# Patient Record
Sex: FEMALE | Race: BLACK OR AFRICAN AMERICAN
[De-identification: names, ages, dates, MRNs, and addresses within clinical notes are randomized per-mention and may not be internally consistent; named-entity substitution may affect disease eponyms.]

---

## 2018-03-20 ENCOUNTER — HOSPITAL ENCOUNTER (INPATIENT)
Dept: HOSPITAL 74 - JDEL | Age: 28
LOS: 3 days | Discharge: HOME | DRG: 560 | End: 2018-03-23
Attending: OBSTETRICS & GYNECOLOGY | Admitting: OBSTETRICS & GYNECOLOGY
Payer: COMMERCIAL

## 2018-03-20 VITALS — BODY MASS INDEX: 37.8 KG/M2

## 2018-03-20 DIAGNOSIS — Z3A.38: ICD-10-CM

## 2018-03-20 LAB
BASOPHILS # BLD: 0.7 % (ref 0–2)
DEPRECATED RDW RBC AUTO: 14.6 % (ref 11.6–15.6)
EOSINOPHIL # BLD: 0.5 % (ref 0–4.5)
HCT VFR BLD CALC: 37.4 % (ref 32.4–45.2)
HGB BLD-MCNC: 12.7 GM/DL (ref 10.7–15.3)
LYMPHOCYTES # BLD: 21.2 % (ref 8–40)
MCH RBC QN AUTO: 28.9 PG (ref 25.7–33.7)
MCHC RBC AUTO-ENTMCNC: 33.9 G/DL (ref 32–36)
MCV RBC: 85.3 FL (ref 80–96)
MONOCYTES # BLD AUTO: 8.7 % (ref 3.8–10.2)
NEUTROPHILS # BLD: 68.9 % (ref 42.8–82.8)
PLATELET # BLD AUTO: 152 K/MM3 (ref 134–434)
PMV BLD: 10.3 FL (ref 7.5–11.1)
RBC # BLD AUTO: 4.38 M/MM3 (ref 3.6–5.2)
WBC # BLD AUTO: 13.6 K/MM3 (ref 4–10)

## 2018-03-20 NOTE — HP
Past Medical History





- Primary Care Physician


PCP:: Edda Mckeon





- Admission


Chief Complaint: 27 yrs  , 38.4 weeks in labor..Onset Lp since 9.00 pm.  

pt was evaluated for labor on 3/19/18 & sent home


History of Present Illness: 


PNC at [facility] .


wt gain 62 lbs . 


Prenatal work Up : B pos, Rpr nr, Hbsag neg, Rubella pos, , Quantiferon neg, , 

Hiv neg, gc/ct neg , Pap normal 








History Source: Patient


Limitations to Obtaining History: No Limitations





- Past Medical History


CNS: No: Migraine, Seizure


Cardiovascular: No: HTN, Murmur


Pulmonary: No: Asthma


Gastrointestinal: Yes: Constipation


Hepatobiliary: No: Hepatitis B


Renal/: No: UTI


...: 1


...Para: 0


...Term: 0


...: 0


...Spon : 0


...Induced : 0


...Multiple Gestation: 0


...LMP: 17


... Weeks Gestation by Dates: 38.1


...EDC by Dates: 18


...EDC by Sono: 18 (38.4)


Heme/Onc: Yes: Anemia


Infectious Disease: No: HIV, Tuberculosis


Psych: No: Addictions, Anxiety, Bipolar, Depression





- Past Surgical History


Past Surgical History: Yes: None


Hx Myomectomy: No


Hx Transabdominal Cerclage: No





- Smoking History


Smoking history: Former smoker


Have you smoked in the past 12 months: Yes


Aproximately how many cigarettes per day: 4 (stopeed 2017, when knew about 

pregnancy)


If you are a former smoker, when did you quit?: WHEN SHE FOUND OUT SHE WAS 

PREGNANT





- Alcohol/Substance Use


Hx Alcohol Use: No


History of Substance Use: reports: None





Home Medications





- Allergies


Allergies/Adverse Reactions: 


 Allergies











Allergy/AdvReac Type Severity Reaction Status Date / Time


 


No Known Allergies Allergy   Verified 18 12:02














- Home Medications


Home Medications: 


Ambulatory Orders





Prenatal Tablet 1 tablet PO DAILY 18 


Ferrous Sulfate 325 mg PO DAILY 18 











Physical Exam - Maternity


Vital Signs: 


 Vital Signs











Temperature  97.8 F   18 22:55


 


Pulse Rate  82   18 22:55


 


Respiratory Rate  20   18 22:55


 


Blood Pressure  131/55   18 22:55


 


O2 Sat by Pulse Oximetry (%)      











Constitutional: Yes: Well Nourished


Eyes: Yes: WNL


HENT: Yes: WNL, Normocephalic


Neck: Yes: WNL


Cardiovascular: Yes: WNL


Lungs: Clear to auscultation


Breast(s): Yes: WNL





- Abdominal Exam/OB


Fundal Height: 38


Number of Fetuses: Single


Fetal Presentation: Vertex


Contractions: Yes


Regularity: Irregular (3-5 min)


Intensity: Mod/Strong


Fetal Monitor Mode: External


Fetal Heart Rate (range): 145


Fetal Heart Rate Location: [facility]


Category: I


Accelerations: Uniform





- Vaginal Exam/OB


Vaginal Bleediing: No


Speculum Exam: No


Dilatation (cm): 4


Effacement (%): 90


Amniotic Membrane Status: Intact


Fetal Presentation: Vertex/Position


Fetal Station: -2





- Physical Exam


Musculoskeletal: Yes: WNL


Extremities: Yes: Deformity.  No: Calf Tenderness


Edema: Yes


Edema: LLE: 1+, RLE: 1+


Integumentary: Yes: WNL, Tattoos


Deep Tendon Reflex Grade: Normal +2


...Motor Strength: WNL


Psychiatric: Yes: WNL, Alert, Oriented





- Labs


Lab Results: 





 Laboratory Tests











  18





  23:20 23:20 23:20


 


WBC  13.6 H  


 


Hgb  12.7  


 


Hct  37.4  


 


Plt Count  152  


 


Neutrophils %  68.9  


 


Lymphocytes %  21.2  


 


Monocytes %  8.7  


 


PT with INR   10.70 


 


INR   0.95 


 


PTT (Actin FS)   27.8 


 


Sodium    136


 


Potassium    4.0


 


Chloride    103


 


Carbon Dioxide    25


 


BUN    7


 


Creatinine    0.6


 


Random Glucose    83


 


Calcium    8.6


 


HIV 1&2 Antibody Screen   


 


HIV P24 Antigen   


 


Blood Type   


 


Antibody Screen   














  18





  23:20 23:20


 


WBC  


 


Hgb  


 


Hct  


 


Plt Count  


 


Neutrophils %  


 


Lymphocytes %  


 


Monocytes %  


 


PT with INR  


 


INR  


 


PTT (Actin FS)  


 


Sodium  


 


Potassium  


 


Chloride  


 


Carbon Dioxide  


 


BUN  


 


Creatinine  


 


Random Glucose  


 


Calcium  


 


HIV 1&2 Antibody Screen   Negative


 


HIV P24 Antigen   Negative


 


Blood Type  B POSITIVE 


 


Antibody Screen  Negative 














Problem List





- Problems


(1) Pregnancy with 38 completed weeks gestation


Code(s): Z3A.38 - 38 WEEKS GESTATION OF PREGNANCY   





(2) Labor established


Code(s): IYH5040 -    





Assessment/Plan


27 yrs  , 38.4 weeks by sono & 38.1/7 weeks admitted in labor . GBS neg 


Plan : trial of labor for vag . delivery 


         epidural labor analgesia prn

## 2018-03-21 LAB
ANION GAP SERPL CALC-SCNC: 8 MMOL/L (ref 8–16)
APTT BLD: 27.8 SECONDS (ref 26.9–34.4)
ARTERIAL BLOOD GAS PCO2: 58.6 MMHG (ref 35–45)
BASE EXCESS BLDA CALC-SCNC: -5 MEQ/L (ref -2–2)
BUN SERPL-MCNC: 7 MG/DL (ref 7–18)
CALCIUM SERPL-MCNC: 8.6 MG/DL (ref 8.5–10.1)
CHLORIDE SERPL-SCNC: 103 MMOL/L (ref 98–107)
CO2 SERPL-SCNC: 25 MMOL/L (ref 21–32)
CREAT SERPL-MCNC: 0.6 MG/DL (ref 0.55–1.02)
GLUCOSE SERPL-MCNC: 83 MG/DL (ref 74–106)
INR BLD: 0.95 (ref 0.82–1.09)
PH BLDV: 7.34 [PH] (ref 7.32–7.42)
PO2 BLDA: 19.8 MMHG (ref 80–100)
POTASSIUM SERPLBLD-SCNC: 4 MMOL/L (ref 3.5–5.1)
PT PNL PPP: 10.7 SEC (ref 9.98–11.88)
SAO2 % BLDA: 38.6 % (ref 90–98.9)
SODIUM SERPL-SCNC: 136 MMOL/L (ref 136–145)
VENOUS PC02: 42 MMHG (ref 38–52)
VENOUS PO2: 34.7 MMHG (ref 28–48)

## 2018-03-21 RX ADMIN — FERROUS SULFATE TAB EC 324 MG (65 MG FE EQUIVALENT) SCH MG: 324 (65 FE) TABLET DELAYED RESPONSE at 17:41

## 2018-03-21 RX ADMIN — ACETAMINOPHEN PRN MG: 325 TABLET ORAL at 11:05

## 2018-03-21 RX ADMIN — IBUPROFEN PRN MG: 600 TABLET, FILM COATED ORAL at 11:05

## 2018-03-21 RX ADMIN — IBUPROFEN PRN MG: 600 TABLET, FILM COATED ORAL at 16:38

## 2018-03-21 RX ADMIN — ACETAMINOPHEN PRN MG: 325 TABLET ORAL at 16:38

## 2018-03-21 RX ADMIN — Medication SCH: at 11:15

## 2018-03-21 RX ADMIN — IBUPROFEN PRN MG: 600 TABLET, FILM COATED ORAL at 21:10

## 2018-03-21 RX ADMIN — ACETAMINOPHEN PRN MG: 325 TABLET ORAL at 21:12

## 2018-03-21 NOTE — PN
Progress Note, Labor


  ** Vaginal Exam #1


Labor Exam Date: 03/21/18


Labor Exam Time: 05:05


Fetal Heart Rate (range): 140


Dilatation: 8


Effacement (%): 100


Amniotic Membrane Status: Ruptured (AROM , clear , large amount)


Fetal Presentation: Vertex/Position


Fetal Station: 0


Remarks: 


fhr cat-1 


uc 2-4 min 


Epidural analgesia was given at 140 AM 


 Selected Entries











  03/21/18 03/21/18





  04:00 04:15


 


Temperature 98.4 F 


 


Pulse Rate  78


 


Blood Pressure  123/81














  ** Vaginal Exam #2


Labor Exam Date: 03/21/18


Labor Exam Time: 07:50


Fetal Heart Rate (range): 140


Dilatation: antlip


Effacement (%): 100


Amniotic Membrane Status: Ruptured


Fetal Presentation: Vertex/Position


Fetal Station: +2 (caput)


Remarks: 


uc irregular , dysfunctional 2-5 min 


fhr cat-1 


Plan pitocin Augmentation 


 Selected Entries











  03/21/18





  07:15


 


Pulse Rate 81


 


Respiratory 20





Rate 


 


Blood Pressure 128/66














  ** Vaginal Exam #3


Labor Exam Date: 03/21/18


Labor Exam Time: 08:50


Fetal Heart Rate (range): 90


Dilatation: 10


Effacement (%): 100


Amniotic Membrane Status: Ruptured


Fetal Station: +3


Remarks: 


UC 2-3 min 


FHR cat-2 


pt pushing 


 Selected Entries











  03/21/18





  08:00


 


Temperature 98.1 F


 


Pulse Rate 89


 


Respiratory 17





Rate

## 2018-03-21 NOTE — PN
Delivery





- Delivery


Vaginal Delivery: No Problems, Spontaneous (Baby dekievered in VENANCIO position, 

cord around neck x1 untangled before delivery of shoulder , immediate oral & 

nasal suction was done .)


Type of Anesthesia: Local, Epidural


Episiotomy/Laceration: Midline (episiotomy was sutured in layers with chr 

catgut #2/0 . Pr exam mucosa & sphincter was intact)


EBL (cc): 350





Delivery, Single Birth





- Stages of Labor


Date 1st Stage Initiatied: 18


Time 1st Stage Initiated: 21:00


Date 2nd Stage Initiated: 18


Time 2nd Stage Initiated: 08:50


Date of Delivery: 18


Time of Delivery: 08:57


Date Placenta Delivered: 18


Time Placenta Delivered: 09:06


Placenta: Yes: Spontaneous, Uterine Exploration





- Condition of Infant


Infant Gender: Male


Position: Right, OA (cord around neck x1)


Total Hours ROM (Hrs/Mins): 4rr 1 min 





- Apgar


  ** 1 Minute


Apgar Total Score: 9





  ** 5 Minutes


Apgar Total Score: 9





-  Feeding Plan


Initial Plan: Elected not to breastfeed exclusively throughout hospitalization





Remarks





- Remarks


Remarks: 


27 Yrs  38.4/7 weeks , GBS neg , admitted in labor 


[facility] 


Intrapartum  course was uneventful

## 2018-03-22 VITALS — TEMPERATURE: 98.3 F

## 2018-03-22 LAB
BASOPHILS # BLD: 0.2 % (ref 0–2)
DEPRECATED RDW RBC AUTO: 14.7 % (ref 11.6–15.6)
EOSINOPHIL # BLD: 0.5 % (ref 0–4.5)
HCT VFR BLD CALC: 33.4 % (ref 32.4–45.2)
HGB BLD-MCNC: 11 GM/DL (ref 10.7–15.3)
LYMPHOCYTES # BLD: 15.1 % (ref 8–40)
MCH RBC QN AUTO: 28.5 PG (ref 25.7–33.7)
MCHC RBC AUTO-ENTMCNC: 33 G/DL (ref 32–36)
MCV RBC: 86.4 FL (ref 80–96)
MONOCYTES # BLD AUTO: 7.4 % (ref 3.8–10.2)
NEUTROPHILS # BLD: 76.8 % (ref 42.8–82.8)
PLATELET # BLD AUTO: 139 K/MM3 (ref 134–434)
PMV BLD: 9.5 FL (ref 7.5–11.1)
RBC # BLD AUTO: 3.87 M/MM3 (ref 3.6–5.2)
WBC # BLD AUTO: 18.9 K/MM3 (ref 4–10)

## 2018-03-22 RX ADMIN — ACETAMINOPHEN PRN MG: 325 TABLET ORAL at 02:55

## 2018-03-22 RX ADMIN — FERROUS SULFATE TAB EC 324 MG (65 MG FE EQUIVALENT) SCH MG: 324 (65 FE) TABLET DELAYED RESPONSE at 18:15

## 2018-03-22 RX ADMIN — Medication SCH TAB: at 11:37

## 2018-03-22 RX ADMIN — IBUPROFEN PRN MG: 600 TABLET, FILM COATED ORAL at 08:14

## 2018-03-22 RX ADMIN — ACETAMINOPHEN PRN MG: 325 TABLET ORAL at 21:27

## 2018-03-22 RX ADMIN — IBUPROFEN PRN MG: 600 TABLET, FILM COATED ORAL at 02:56

## 2018-03-22 RX ADMIN — FERROUS SULFATE TAB EC 324 MG (65 MG FE EQUIVALENT) SCH MG: 324 (65 FE) TABLET DELAYED RESPONSE at 08:14

## 2018-03-22 NOTE — PN
Post Partum Progress Note





- Subjective


Subjective: 





28 yo Para 1 status post normal vaginal delivery, seen and evaluated.


Doing well.


Post Partum Day: 1


Type of Delivery: 


Vital Signs: 


 Vital Signs











Temperature  98.4 F   18 06:00


 


Pulse Rate  66   18 06:00


 


Respiratory Rate  18   18 06:00


 


Blood Pressure  101/67   18 06:00


 


O2 Sat by Pulse Oximetry (%)  96   18 22:00











Breast Exam: Yes: Soft


Uterus: Yes: Fundus Firm


Abdomen/GI: Yes: Abdomen soft, Tolerating PO


Lochia: Yes: Rubra


Lochia, amount: Moderate


Extremities: Yes: Calves non-tender


Perineum: Yes: Episiotomy (Healing)


Activity: Ambulating





- Labs


Labs: 


 CBC











WBC  18.9 K/mm3 (4.0-10.0)  H D 18  07:45    


 


RBC  3.87 M/mm3 (3.60-5.2)   18  07:45    


 


Hgb  11.0 GM/dL (10.7-15.3)  D 18  07:45    


 


Hct  33.4 % (32.4-45.2)   18  07:45    


 


MCV  86.4 fl (80-96)   18  07:45    


 


MCH  28.5 pg (25.7-33.7)   18  07:45    


 


MCHC  33.0 g/dl (32.0-36.0)   18  07:45    


 


RDW  14.7 % (11.6-15.6)   18  07:45    


 


Plt Count  139 K/MM3 (134-434)   18  07:45    


 


MPV  9.5 fl (7.5-11.1)   18  07:45    


 


Neutrophils %  76.8 % (42.8-82.8)   18  07:45    


 


Lymphocytes %  15.1 % (8-40)  D 18  07:45    


 


Monocytes %  7.4 % (3.8-10.2)   18  07:45    


 


Eosinophils %  0.5 % (0-4.5)   18  07:45    


 


Basophils %  0.2 % (0-2.0)   18  07:45    














Problem List





- Problems


(1) Status post vaginal delivery


Code(s): XRN7320 -    





Assessment/Plan





Status post vaginal delivery


Stable


Continue routine postpartum care

## 2018-03-23 VITALS — DIASTOLIC BLOOD PRESSURE: 62 MMHG | SYSTOLIC BLOOD PRESSURE: 125 MMHG | HEART RATE: 67 BPM

## 2018-03-23 RX ADMIN — Medication SCH TAB: at 08:59

## 2018-03-23 RX ADMIN — IBUPROFEN PRN MG: 600 TABLET, FILM COATED ORAL at 08:58

## 2018-03-23 RX ADMIN — ACETAMINOPHEN PRN MG: 325 TABLET ORAL at 08:58

## 2018-03-23 RX ADMIN — FERROUS SULFATE TAB EC 324 MG (65 MG FE EQUIVALENT) SCH MG: 324 (65 FE) TABLET DELAYED RESPONSE at 08:59

## 2018-03-23 NOTE — DS
Physical Exam-GYN


Vital Signs: 


 Vital Signs











Temperature  98.3 F   18 21:00


 


Pulse Rate  69   18 21:00


 


Respiratory Rate  20   18 21:00


 


Blood Pressure  106/66   18 21:00


 


O2 Sat by Pulse Oximetry (%)  96   18 22:00











Constitutional: Yes: Well Nourished


Eyes: Yes: WNL


HENT: Yes: WNL


Neck: Yes: WNL


Cardiovascular: Yes: WNL


Respiratory: Yes: WNL


Gastrointestinal: Yes: WNL


...Rectal Exam: Yes: WNL


Renal/: Yes: WNL


....Post Partum: Yes: Uterus firm, Moderate lochia rubra (episiotomy healing . c

/o perineal soreness)


Breast(s): Yes: WNL


Musculoskeletal: Yes: WNL


Extremities: Yes: WNL.  No: Calf Tenderness


Edema: Yes


Neurological: Yes: WNL


...Motor Strength: WNL


Psychiatric: Yes: WNL, Alert, Oriented


Labs: 


 CBC, BMP





 18 07:45 





 18 23:20 











Delivery





- Delivery


Vaginal Delivery: No Problems, Spontaneous (Baby dekievered in VENANCIO position, 

cord around neck x1 untangled before delivery of shoulder , immediate oral & 

nasal suction was done .)


Type of Anesthesia: Local, Epidural


Episiotomy/Laceration: Midline


EBL (cc): 350





Delivery, Single Birth





- Stages of Labor


Date 1st Stage Initiatied: 18


Time 1st Stage Initiated: 21:00


Date 2nd Stage Initiated: 18


Time 2nd Stage Initiated: 08:50


Date of Delivery: 18


Time of Delivery: 08:57


Time Placenta Delivered: 09:06


Placenta: Yes: Spontaneous, Uterine Exploration





- Condition of Infant


Pediatrician/Neonatologist Present: No


Infant Gender: Male


Birth Weight: 7 lb 3 oz


Position: Right, OA


Total Hours ROM (Hrs/Mins): 4rr 1 min 





- Apgar


  ** 1 Minute


Apgar Total Score: 9





  ** 5 Minutes


Apgar Total Score: 9





-  Feeding Plan


Initial Plan: Elected not to breastfeed exclusively throughout hospitalization





Remarks





- Remarks


Remarks: 


27 Yrs  38.4/7 weeks , GBS neg , admitted in labor 


[facility] 


Intrapartum  course was uneventful 


pp course uneventful 


discharge today 











Discharge Summary


Reason For Visit: LABOR ADMIT


Current Active Problems





Labor established (Acute)


Pregnancy with 38 completed weeks gestation (Acute)


Status post vaginal delivery (Acute)








Condition: Stable





- Instructions


Diet, Activity, Other Instructions: 


Post Partum Instructions





DIET:


Continue good diet high in protein, calcium, and iron rich foods. Drink at 

least eight (8) glasses of water daily in addition to other fluids.


ct   Regular diet








MEDICATIONS:


Continue prenatal vitamins and iron as previously directed. Motrin and Tylenol 

may be taken for minor discomfort. 





ACTIVITY:


Mild to moderate exercise may be started in two (2) weeks. Take frequent rest 

periods. Resume normal activity after six (6) week check up. 





WOUND CARE OF OPERATIVE SITE:


Continue use of perineal bottle until vaginal discharge stops. Keep area clean. 

Shower daily. Keep abdominal wound dry. Report any drainage or redness to 

physician. Tub baths, tampons and douches are not permitted for 6 weeks.





ct  Breast feeding & or Bottle feeding





BREAST CARE: (For those that are not breast feeding): If engorgement occurs:


Wear tight fitting bra.


Take Tylenol or Motrin for pain.


Apply cold packs (ice in bags to each breast )





FAMILY PLANNING:


There are many birth control alternatives to pursue and they should be 

discussed at your first office visit. You may resume sexual activity after your 

six (6) week check up. (Remember, breast feeding is not a contraceptive)





NEXT PHYSICIAN APPOINTMENT:


Be certain to call for a six (6) week appointment, unless otherwise directed.  

Call [facility] for appt 





Call Clinic or got to Emergency Dept if you have any of the following:


   Heavy vaginal bleeding


   Painful urination


   Leg pain


   Unusual odor noted to vaginal bleeding


   High fever


   Red streaking noted on breast








Disposition: HOME





- Home Medications


Comprehensive Discharge Medication List: 


Ambulatory Orders





Prenatal Tablet 1 tablet PO DAILY 18 


Ferrous Sulfate 325 mg PO DAILY 18 


Acetaminophen [Tylenol .Regular Strength -] 650 mg PO Q3H PRN  tablet 18 


Benzocaine [Americaine 20% Spray -] 1 spray TP PRN PRN  bottle 18 


Ferrous Sulfate [Feosol] 325 mg PO BIDWM  tab 18 


Ibuprofen [Motrin -] 600 mg PO Q4H PRN #20 tablet 18 


Prenatal Vitamins (Sjr) - 1 tab PO DAILY  tablet 18

## 2019-01-19 ENCOUNTER — HOSPITAL ENCOUNTER (INPATIENT)
Dept: HOSPITAL 74 - JLDR | Age: 29
LOS: 2 days | Discharge: HOME | DRG: 560 | End: 2019-01-21
Attending: OBSTETRICS & GYNECOLOGY | Admitting: OBSTETRICS & GYNECOLOGY
Payer: COMMERCIAL

## 2019-01-19 VITALS — BODY MASS INDEX: 39.9 KG/M2

## 2019-01-19 DIAGNOSIS — F12.10: ICD-10-CM

## 2019-01-19 DIAGNOSIS — O99.213: ICD-10-CM

## 2019-01-19 DIAGNOSIS — O99.323: ICD-10-CM

## 2019-01-19 DIAGNOSIS — Z3A.37: ICD-10-CM

## 2019-01-19 DIAGNOSIS — E66.9: ICD-10-CM

## 2019-01-19 LAB
ALBUMIN SERPL-MCNC: 2.6 G/DL (ref 3.4–5)
ALP SERPL-CCNC: 128 U/L (ref 45–117)
ALT SERPL-CCNC: 20 U/L (ref 13–61)
AMPHET UR-MCNC: NEGATIVE NG/ML
ANION GAP SERPL CALC-SCNC: 7 MMOL/L (ref 8–16)
APPEARANCE UR: CLEAR
APTT BLD: 23.4 SECONDS (ref 25.2–36.5)
AST SERPL-CCNC: 20 U/L (ref 15–37)
BACTERIA #/AREA URNS HPF: (no result) /HPF
BARBITURATES UR-MCNC: NEGATIVE NG/ML
BASOPHILS # BLD: 0.3 % (ref 0–2)
BENZODIAZ UR SCN-MCNC: NEGATIVE NG/ML
BILIRUB SERPL-MCNC: 0.2 MG/DL (ref 0.2–1)
BILIRUB UR STRIP.AUTO-MCNC: NEGATIVE MG/DL
BUN SERPL-MCNC: 9 MG/DL (ref 7–18)
CALCIUM SERPL-MCNC: 9.1 MG/DL (ref 8.5–10.1)
CHLORIDE SERPL-SCNC: 106 MMOL/L (ref 98–107)
CO2 SERPL-SCNC: 25 MMOL/L (ref 21–32)
COCAINE UR-MCNC: NEGATIVE NG/ML
COLOR UR: YELLOW
CREAT SERPL-MCNC: 0.7 MG/DL (ref 0.55–1.3)
DEPRECATED RDW RBC AUTO: 15.2 % (ref 11.6–15.6)
EOSINOPHIL # BLD: 0.6 % (ref 0–4.5)
EPITH CASTS URNS QL MICRO: (no result) /HPF
GLUCOSE SERPL-MCNC: 101 MG/DL (ref 74–106)
HCT VFR BLD CALC: 36.2 % (ref 32.4–45.2)
HGB BLD-MCNC: 12.1 GM/DL (ref 10.7–15.3)
INR BLD: 0.9 (ref 0.83–1.09)
KETONES UR QL STRIP: NEGATIVE
LEUKOCYTE ESTERASE UR QL STRIP.AUTO: NEGATIVE
LYMPHOCYTES # BLD: 28.7 % (ref 8–40)
MCH RBC QN AUTO: 27 PG (ref 25.7–33.7)
MCHC RBC AUTO-ENTMCNC: 33.4 G/DL (ref 32–36)
MCV RBC: 81 FL (ref 80–96)
METHADONE UR-MCNC: NEGATIVE NG/ML
MONOCYTES # BLD AUTO: 8.1 % (ref 3.8–10.2)
MUCOUS THREADS URNS QL MICRO: (no result)
NEUTROPHILS # BLD: 62.3 % (ref 42.8–82.8)
NITRITE UR QL STRIP: NEGATIVE
OPIATES UR QL SCN: NEGATIVE NG/ML
PCP UR QL SCN: NEGATIVE NG/ML
PH UR: 6 [PH] (ref 5–8)
PLATELET # BLD AUTO: 146 K/MM3 (ref 134–434)
PMV BLD: 10.1 FL (ref 7.5–11.1)
POTASSIUM SERPLBLD-SCNC: 3.8 MMOL/L (ref 3.5–5.1)
PROT SERPL-MCNC: 6.6 G/DL (ref 6.4–8.2)
PROT UR QL STRIP: NEGATIVE
PROT UR QL STRIP: NEGATIVE
PT PNL PPP: 10.6 SEC (ref 9.7–13)
RBC # BLD AUTO: 4.47 M/MM3 (ref 3.6–5.2)
SODIUM SERPL-SCNC: 138 MMOL/L (ref 136–145)
SP GR UR: 1.02 (ref 1.01–1.03)
UROBILINOGEN UR STRIP-MCNC: 2 MG/DL (ref 0.2–1)
WBC # BLD AUTO: 9.1 K/MM3 (ref 4–10)

## 2019-01-19 PROCEDURE — 0KQM0ZZ REPAIR PERINEUM MUSCLE, OPEN APPROACH: ICD-10-PCS | Performed by: OBSTETRICS & GYNECOLOGY

## 2019-01-19 RX ADMIN — IBUPROFEN PRN MG: 600 TABLET, FILM COATED ORAL at 17:21

## 2019-01-19 RX ADMIN — FERROUS SULFATE TAB EC 324 MG (65 MG FE EQUIVALENT) SCH MG: 324 (65 FE) TABLET DELAYED RESPONSE at 07:50

## 2019-01-19 RX ADMIN — IBUPROFEN PRN MG: 600 TABLET, FILM COATED ORAL at 07:50

## 2019-01-19 RX ADMIN — Medication SCH TAB: at 10:07

## 2019-01-19 RX ADMIN — ACETAMINOPHEN PRN MG: 325 TABLET ORAL at 07:51

## 2019-01-19 RX ADMIN — ACETAMINOPHEN PRN MG: 325 TABLET ORAL at 17:20

## 2019-01-19 RX ADMIN — FERROUS SULFATE TAB EC 324 MG (65 MG FE EQUIVALENT) SCH MG: 324 (65 FE) TABLET DELAYED RESPONSE at 17:20

## 2019-01-19 NOTE — HP
Past Medical History





- Primary Care Physician


PCP:: Edda Mckeon





- Admission


Chief Complaint: 28 yrs  , 37 weeks admitted in labor 37.1  weeks by 

sono is admitted in labor . onset LP since 8.00 PM


History of Present Illness: 


h/o pnc at [facility] 


no records available 


she had songram done with dr chris's office , was assigned edc as 19 


wt gain 38 lbs 





History Source: Patient


Limitations to Obtaining History: No Limitations





- Past Medical History


CNS: No: Migraine, Seizure


Cardiovascular: No: HTN, Murmur


Pulmonary: No: Asthma


Gastrointestinal: Yes: Constipation


Renal/: Yes: UTI (h/o uti twice during current( [id number])  pregnancy.)


...: 2


...Para: 1 (3/21/18  7lbs [facility] )


...LMP: 18


... Weeks Gestation by Dates: 37.3


...EDC by Dates: 19


...EDC by Sono: 19 (37 weeks by sono )


Heme/Onc: Yes: Anemia (rx po iron & prenatal vit)


Infectious Disease: Yes: Other (declines)


Psych: No: Addictions, Anxiety, Bipolar, Depression, Panic, Psychosis, 

Schizophrenia, Other


Endocrine: Yes: Other (obesity).  No: Diabetes Mellitus, Hypothyroidism





- Past Surgical History


Past Surgical History: Yes: None


Hx Myomectomy: No


Hx Transabdominal Cerclage: No





- Smoking History


Smoking history: Former smoker


Have you smoked in the past 12 months: Yes


Aproximately how many cigarettes per day: 4 (stopeed 2017, when knew about 

pregnancy)


If you are a former smoker, when did you quit?: WHEN SHE FOUND OUT SHE WAS 

PREGNANT





- Alcohol/Substance Use


Hx Alcohol Use: No


History of Substance Use: reports: None, Marijuana (urine drug screen positive )





Home Medications





- Allergies


Allergies/Adverse Reactions: 


 Allergies











Allergy/AdvReac Type Severity Reaction Status Date / Time


 


No Known Allergies Allergy   Verified 18 12:02














- Home Medications


Home Medications: 


Ambulatory Orders





Prenatal Tablet 1 tablet PO DAILY 18 


Ferrous Sulfate 325 mg PO DAILY 18 


Acetaminophen [Tylenol .Regular Strength -] 650 mg PO Q3H PRN  tablet 18 


Benzocaine [Americaine 20% Spray -] 1 spray TP PRN PRN  bottle 18 


Ferrous Sulfate [Feosol] 325 mg PO BIDWM  tab 18 


Ibuprofen [Motrin -] 600 mg PO Q4H PRN #20 tablet 18 


Prenatal Vitamins (Sjr) - 1 tab PO DAILY  tablet 18 











Physical Exam - Maternity


Constitutional: Yes: Well Nourished, Obese


Eyes: Yes: WNL


HENT: Yes: WNL, Normocephalic


Neck: Yes: WNL


Cardiovascular: Yes: WNL, Regular Rate and Rhythm


Lungs: Clear to auscultation


Breast(s): Yes: WNL





- Abdominal Exam/OB


Fundal Height: 40


Number of Fetuses: Single


Fetal Presentation: Vertex


Contractions: Yes


Regularity: Regular


Intensity: Strong


Fetal Monitor Mode: External


Fetal Heart Rate (range): 150


Fetal Heart Rate Location: [facility]


Category: I


Accelerations: Uniform


Decelerations: None





- Vaginal Exam/OB


Vaginal Bleediing: No


Speculum Exam: No


Dilatation (cm): 10


Effacement (%): 100


Amniotic Membrane Status: Ruptured (1.05 AM , large amount)


Nitrazine Test: Positive


Amniotic Fluid: Yes: Clear


Fetal Presentation: Vertex/Position


Fetal Station: +2





- Physical Exam


Musculoskeletal: Yes: WNL


Extremities: Yes: WNL.  No: Calf Tenderness


Edema: LLE: 1+, RLE: 1+


Integumentary: Yes: WNL, Tattoos


Deep Tendon Reflex Grade: Normal +2


...Motor Strength: WNL


Psychiatric: Yes: WNL, Alert, Oriented





- Labs


Lab Results: 


 CBC, BMP





 19 00:30 





 19 00:30 





 Laboratory Tests











  19





  00:30 00:30 00:30


 


AST  20  


 


ALT  20  


 


Urine Protein    Negative


 


Urine Blood    1+ H


 


Ur Leukocyte Esterase    Negative


 


Urine WBC (Auto)    1


 


Urine RBC (Auto)    1


 


Ur Epithelial Cells    Rare


 


Urine Bacteria    Rare


 


Urine Mucus    Rare


 


U Marijuana (THC) Screen   


 


HIV 1&2 Antibody Screen   Negative 


 


HIV P24 Antigen   Negative 














  19





  00:30


 


AST 


 


ALT 


 


Urine Protein 


 


Urine Blood 


 


Ur Leukocyte Esterase 


 


Urine WBC (Auto) 


 


Urine RBC (Auto) 


 


Ur Epithelial Cells 


 


Urine Bacteria 


 


Urine Mucus 


 


U Marijuana (THC) Screen  Positive A*


 


HIV 1&2 Antibody Screen 


 


HIV P24 Antigen 














Problem List





- Problems


(1) Pregnancy with 37 weeks completed gestation


Code(s): Z3A.37 - 37 WEEKS GESTATION OF PREGNANCY   





(2) Labor established


Code(s): OJY2193 -    





(3) Obesity


Code(s): E66.9 - OBESITY, UNSPECIFIED   





(4) Substance abuse


Code(s): F19.10 - OTHER PSYCHOACTIVE SUBSTANCE ABUSE, UNCOMPLICATED   





Assessment/Plan


28 yrs , 37 .1 weeks admitted in labor , gbs unknown, prenatal records 

unavailable , urine drug screen positive for Marijuana


Plan pt received 2 gm IV Ampicillin for GBS prophylaxis


       pt delivered  at 1,28 Am

## 2019-01-19 NOTE — PN
Delivery





- Delivery


Vaginal Delivery: No Problems, Spontaneous (baby Girl, delievred vx presentation

, [place] position, shoulders delivered without difficulty. placent a& membranes 

delivered  completely.2 nd degree laceration noted repaired under Local 

anesthesia with chr catgut #2/0in layers . IL exam mucosa & sphincter intact .. 

songe count correct .)


Type of Anesthesia: Local


Episiotomy/Laceration: 2nd degree


EBL (cc): 300





Delivery, Single Birth





- Stages of Labor


Date 1st Stage Initiatied: 19


Time 1st Stage Initiated: 20:00


Date 2nd Stage Initiated: 19


Time 2nd Stage Initiated: 01:00


Date of Delivery: 19


Time of Delivery: :28


Date Placenta Delivered: 19


Time Placenta Delivered: :


Placenta: Yes: Spontaneous, Uterine Exploration





- Condition of Infant


Infant Gender: Female


Birth Weight: 6 lb 15 oz


Position: Left, OA


Total Hours ROM (Hrs/Mins): 23 min 





- Apgar


  ** 1 Minute


Apgar Total Score: 9





  ** 5 Minutes


Apgar Total Score: 9





Remarks





- Remarks


Remarks: 


28 yrs   37.1 weeks , gbs unknown, [facility]nc at [facility] 


rx iv Ampicillin 2 gm one dose received 


stadol & phenrgan iv one dose given 


pt stable

## 2019-01-20 LAB
BASOPHILS # BLD: 0.3 % (ref 0–2)
DEPRECATED RDW RBC AUTO: 15 % (ref 11.6–15.6)
EOSINOPHIL # BLD: 1.1 % (ref 0–4.5)
HCT VFR BLD CALC: 30.8 % (ref 32.4–45.2)
HGB BLD-MCNC: 10.1 GM/DL (ref 10.7–15.3)
LYMPHOCYTES # BLD: 30.9 % (ref 8–40)
MCH RBC QN AUTO: 26.9 PG (ref 25.7–33.7)
MCHC RBC AUTO-ENTMCNC: 32.7 G/DL (ref 32–36)
MCV RBC: 82.1 FL (ref 80–96)
MONOCYTES # BLD AUTO: 7.1 % (ref 3.8–10.2)
NEUTROPHILS # BLD: 60.6 % (ref 42.8–82.8)
PLATELET # BLD AUTO: 131 K/MM3 (ref 134–434)
PMV BLD: 9.5 FL (ref 7.5–11.1)
RBC # BLD AUTO: 3.75 M/MM3 (ref 3.6–5.2)
WBC # BLD AUTO: 12.1 K/MM3 (ref 4–10)

## 2019-01-20 RX ADMIN — FERROUS SULFATE TAB EC 324 MG (65 MG FE EQUIVALENT) SCH MG: 324 (65 FE) TABLET DELAYED RESPONSE at 17:28

## 2019-01-20 RX ADMIN — IBUPROFEN PRN MG: 600 TABLET, FILM COATED ORAL at 04:48

## 2019-01-20 RX ADMIN — FERROUS SULFATE TAB EC 324 MG (65 MG FE EQUIVALENT) SCH MG: 324 (65 FE) TABLET DELAYED RESPONSE at 08:18

## 2019-01-20 RX ADMIN — IBUPROFEN PRN MG: 600 TABLET, FILM COATED ORAL at 12:04

## 2019-01-20 RX ADMIN — ACETAMINOPHEN PRN MG: 325 TABLET ORAL at 20:32

## 2019-01-20 RX ADMIN — ACETAMINOPHEN PRN MG: 325 TABLET ORAL at 12:03

## 2019-01-20 RX ADMIN — ACETAMINOPHEN PRN MG: 325 TABLET ORAL at 04:48

## 2019-01-20 RX ADMIN — Medication SCH TAB: at 09:34

## 2019-01-20 RX ADMIN — IBUPROFEN PRN MG: 600 TABLET, FILM COATED ORAL at 20:31

## 2019-01-20 NOTE — PN
Post Partum Progress Note





- Subjective


Subjective: 


c/o cramps 





Post Partum Day: 1


Type of Delivery: 


Vital Signs: 


 Vital Signs











Temperature  98.1 F   19 22:00


 


Pulse Rate  67   19 22:00


 


Respiratory Rate  18   19 22:00


 


Blood Pressure  113/66   19 22:00


 


O2 Sat by Pulse Oximetry (%)  100   19 02:45











Breast Exam: Yes: Soft, Other (bottle & breast feeding ).  No: Engorged


Uterus: Yes: Fundus Firm, Fundus below umbilicus, Non-tender


Lochia: Yes: Rubra


Lochia, amount: Moderate


Extremities: Yes: Calves non-tender


Perineum: Yes: Laceration (1st degree healing )





- Labs


Labs: 


 CBC











WBC  9.1 K/mm3 (4.0-10.0)   19  00:30    


 


RBC  4.47 M/mm3 (3.60-5.2)   19  00:30    


 


Hgb  12.1 GM/dL (10.7-15.3)   19  00:30    


 


Hct  36.2 % (32.4-45.2)   19  00:30    


 


MCV  81.0 fl (80-96)   19  00:30    


 


MCH  27.0 pg (25.7-33.7)   19  00:30    


 


MCHC  33.4 g/dl (32.0-36.0)   19  00:30    


 


RDW  15.2 % (11.6-15.6)   19  00:30    


 


Plt Count  146 K/MM3 (134-434)   19  00:30    


 


MPV  10.1 fl (7.5-11.1)   19  00:30    


 


Absolute Neuts (auto)  5.6 K/mm3 (1.5-8.0)   19  00:30    


 


Neutrophils %  62.3 % (42.8-82.8)   19  00:30    


 


Lymphocytes %  28.7 % (8-40)  D 19  00:30    


 


Monocytes %  8.1 % (3.8-10.2)   19  00:30    


 


Eosinophils %  0.6 % (0-4.5)   19  00:30    


 


Basophils %  0.3 % (0-2.0)   19  00:30    


 


Nucleated RBC %  0 % (0-0)   19  00:30    














Problem List





- Problems


(1) Pregnancy with 37 weeks completed gestation


Code(s): Z3A.37 - 37 WEEKS GESTATION OF PREGNANCY   





(2) Labor established


Code(s): QSZ7018 -    





(3) Obesity


Code(s): E66.9 - OBESITY, UNSPECIFIED   





(4) Substance abuse


Code(s): F19.10 - OTHER PSYCHOACTIVE SUBSTANCE ABUSE, UNCOMPLICATED   





(5) Vaginal delivery


Code(s): O80 - ENCOUNTER FOR FULL-TERM UNCOMPLICATED DELIVERY   





(6) Encounter for postpartum care after hospital delivery


Code(s): Z39.2 - ENCOUNTER FOR ROUTINE POSTPARTUM FOLLOW-UP   





Assessment/Plan


stable 


CPS visited the patient yesterday .


Plan discharge today

## 2019-01-21 VITALS — DIASTOLIC BLOOD PRESSURE: 62 MMHG | SYSTOLIC BLOOD PRESSURE: 119 MMHG | HEART RATE: 62 BPM | TEMPERATURE: 97.9 F

## 2019-01-21 RX ADMIN — Medication SCH TAB: at 10:00

## 2019-01-21 RX ADMIN — FERROUS SULFATE TAB EC 324 MG (65 MG FE EQUIVALENT) SCH MG: 324 (65 FE) TABLET DELAYED RESPONSE at 07:45

## 2019-01-21 RX ADMIN — FERROUS SULFATE TAB EC 324 MG (65 MG FE EQUIVALENT) SCH MG: 324 (65 FE) TABLET DELAYED RESPONSE at 17:35

## 2019-01-21 NOTE — DS
Physical Exam-GYN


Vital Signs: 


 Vital Signs











Temperature  98.1 F   19 22:00


 


Pulse Rate  74   19 22:00


 


Respiratory Rate  18   19 22:00


 


Blood Pressure  109/68   19 22:00


 


O2 Sat by Pulse Oximetry (%)  100   19 02:45











Constitutional: Yes: Well Nourished, Obese, Other (c/o pain & cramps)


Eyes: Yes: WNL


HENT: Yes: WNL, Pharyngeal Erythema


Neck: Yes: WNL


Cardiovascular: Yes: WNL, Regular Rate and Rhythm


Respiratory: Yes: WNL, CTA Bilaterally


Gastrointestinal: Yes: WNL, Normal Bowel Sounds


...Rectal Exam: Yes: WNL


Renal/: Yes: WNL


....Post Partum: Yes: Uterus firm, Uterus non-tender, Moderate lochia rubra (1 

st degree laceration healing)


Breast(s): Yes: WNL (not BF .  Breast not engorged)


Musculoskeletal: Yes: WNL


Extremities: Yes: WNL.  No: Calf Tenderness


Edema: Yes


Edema: LLE: Trace, RLE: Trace


Integumentary: Yes: Tattoos


Neurological: Yes: WNL


...Motor Strength: WNL


Psychiatric: Yes: WNL, Alert, Oriented, Other (pt hostile)


Labs: 


 CBC, BMP





 19 07:30 





 19 00:30 











Delivery





- Delivery


Vaginal Delivery: No Problems, Spontaneous (baby Girl, delievred vx presentation

, Brent position, shoulders delivered without difficulty. placent a& membranes 

delivered  completely.2 nd degree laceration noted repaired under Local 

anesthesia with chr catgut #2/0in layers . VA exam mucosa & sphincter intact .. 

songe count correct .)


Type of Anesthesia: Local


Episiotomy/Laceration: 2nd degree


EBL (cc): 300





Delivery, Single Birth





- Stages of Labor


Date 1st Stage Initiatied: 19


Time 1st Stage Initiated: 20:00


Date 2nd Stage Initiated: 19


Time 2nd Stage Initiated: 01:00


Date of Delivery: 19


Time of Delivery: 01:28


Time Placenta Delivered: 01:31


Placenta: Yes: Spontaneous, Uterine Exploration





- Condition of Infant


Pediatrician/Neonatologist Present: No


Infant Gender: Female


Birth Weight: 6 lb 15 oz


Position: Left, OA


Total Hours ROM (Hrs/Mins): 23 min 





- Apgar


  ** 1 Minute


Apgar Total Score: 9





  ** 5 Minutes


Apgar Total Score: 9





- [place] Feeding Plan


Initial Plan: Elected not to breastfeed exclusively throughout hospitalization





Remarks





- Remarks


Remarks: 


28 yrs   37.1 weeks , gbs unknown, pnc at [facility] 


rx iv Ampicillin 2 gm one dose received 


stadol & phenrgan iv one dose given 


pt stable 


pp stable 


 urine Marijuana  positive in patient , also baby's urine is positive for 

Marijuana  .


 are involved, CPS was called , .CPS worker writes baby' to be 

held until Tuesday after holiday for regular worker to make final decision 

about baby .


patient is discharged today. 








Discharge Summary


Reason For Visit: LABOR


Current Active Problems





Encounter for postpartum care after hospital delivery (Acute)


Normal spontaneous vaginal delivery (Acute)


Obesity (Acute)


Pregnancy with 37 weeks completed gestation (Acute)


Substance abuse (Acute)


Vaginal delivery (Acute)








Condition: Stable





- Instructions


Diet, Activity, Other Instructions: 


Post Partum Instructions





DIET:


Continue good diet high in protein, calcium, and iron rich foods. Drink at 

least eight (8) glasses of water daily in addition to other fluids.


_ct   Regular diet











MEDICATIONS:


Continue prenatal vitamins and iron as previously directed. Motrin and Tylenol 

may be taken for minor discomfort. 





ACTIVITY:


Mild to moderate exercise may be started in two (2) weeks. Take frequent rest 

periods. Resume normal activity after six (6) week check up. 





WOUND CARE OF OPERATIVE SITE:


Continue use of perineal bottle until vaginal discharge stops. Keep area clean. 

Shower daily. Keep abdominal wound dry. Report any drainage or redness to 

physician. Tub baths, tampons and douches are not permitted for 6 weeks.





ct  Breast feeding & or  Bottle feeding





BREAST CARE: (For those that are not breast feeding): If engorgement occurs:


Wear tight fitting bra.


Take Tylenol or Motrin for pain.


Apply cold packs (ice in bags to each breast )





FAMILY PLANNING:


There are many birth control alternatives to pursue and they should be 

discussed at your first office visit. You may resume sexual activity after your 

six (6) week check up. (Remember, breast feeding is not a contraceptive)





NEXT PHYSICIAN APPOINTMENT:


Be certain to call for a six (6) week appointment, unless otherwise directed.  





Call Clinic or got to Emergency Dept if you have any of the following:


   Heavy vaginal bleeding


   Painful urination


   Leg pain


   Unusual odor noted to vaginal bleeding


   High fever


   Red streaking noted on breast








Referrals: 


Edda Mckeon MD [Staff Physician] - 


Disposition: HOME





- Home Medications


Comprehensive Discharge Medication List: 


Ambulatory Orders





Prenatal Tablet 1 tablet PO DAILY 18 


Ferrous Sulfate 325 mg PO DAILY 18 


Acetaminophen [Tylenol .Regular Strength -] 650 mg PO Q3H PRN  tablet 18 


Benzocaine [Americaine 20% Spray -] 1 spray TP PRN PRN  bottle 18 


Ferrous Sulfate [Feosol] 325 mg PO BIDWM  tab 18 


Prenatal Vitamins (Sjr) - 1 tab PO DAILY  tablet 18 


Acetaminophen [Tylenol .Regular Strength -] 650 mg PO Q3H PRN  tablet 19 


Benzocaine [Americaine 20% Spray -] 1 spray TP PRN PRN  bottle 19 


Ferrous Sulfate [Feosol] 325 mg PO BIDWM #60 tab 19 


Ibuprofen [Motrin -] 200 mg PO Q4H PRN  tablet 19 


Prenatal Vitamins (Sjr) - 1 tab PO DAILY #30 tablet 19 


Witch Hazel 50% (Tucks) [Tucks Pads -] 1 pad TP PRN PRN  pad 19 


Ibuprofen [Motrin -] 600 mg PO Q4H PRN #20 tablet 19